# Patient Record
Sex: FEMALE | URBAN - METROPOLITAN AREA
[De-identification: names, ages, dates, MRNs, and addresses within clinical notes are randomized per-mention and may not be internally consistent; named-entity substitution may affect disease eponyms.]

---

## 2020-01-10 ENCOUNTER — EMERGENCY (EMERGENCY)
Facility: HOSPITAL | Age: 55
LOS: 1 days | Discharge: HOME | End: 2020-01-10
Attending: EMERGENCY MEDICINE | Admitting: EMERGENCY MEDICINE
Payer: COMMERCIAL

## 2020-01-10 VITALS
RESPIRATION RATE: 16 BRPM | TEMPERATURE: 98 F | HEIGHT: 66 IN | OXYGEN SATURATION: 98 % | DIASTOLIC BLOOD PRESSURE: 85 MMHG | SYSTOLIC BLOOD PRESSURE: 142 MMHG | HEART RATE: 84 BPM | WEIGHT: 130.07 LBS

## 2020-01-10 LAB
ALBUMIN SERPL ELPH-MCNC: 3.8 G/DL — SIGNIFICANT CHANGE UP (ref 3.3–5)
ALP SERPL-CCNC: 90 U/L — SIGNIFICANT CHANGE UP (ref 30–120)
ALT FLD-CCNC: 53 U/L DA — SIGNIFICANT CHANGE UP (ref 10–60)
AMPHET UR-MCNC: NEGATIVE — SIGNIFICANT CHANGE UP
ANION GAP SERPL CALC-SCNC: 9 MMOL/L — SIGNIFICANT CHANGE UP (ref 5–17)
APAP SERPL-MCNC: <1 UG/ML — LOW (ref 10–30)
AST SERPL-CCNC: 83 U/L — HIGH (ref 10–40)
BARBITURATES UR SCN-MCNC: NEGATIVE — SIGNIFICANT CHANGE UP
BASOPHILS # BLD AUTO: 0.02 K/UL — SIGNIFICANT CHANGE UP (ref 0–0.2)
BASOPHILS NFR BLD AUTO: 0.6 % — SIGNIFICANT CHANGE UP (ref 0–2)
BENZODIAZ UR-MCNC: NEGATIVE — SIGNIFICANT CHANGE UP
BILIRUB SERPL-MCNC: 0.2 MG/DL — SIGNIFICANT CHANGE UP (ref 0.2–1.2)
BUN SERPL-MCNC: 11 MG/DL — SIGNIFICANT CHANGE UP (ref 7–23)
CALCIUM SERPL-MCNC: 8.8 MG/DL — SIGNIFICANT CHANGE UP (ref 8.4–10.5)
CHLORIDE SERPL-SCNC: 106 MMOL/L — SIGNIFICANT CHANGE UP (ref 96–108)
CO2 SERPL-SCNC: 30 MMOL/L — SIGNIFICANT CHANGE UP (ref 22–31)
COCAINE METAB.OTHER UR-MCNC: NEGATIVE — SIGNIFICANT CHANGE UP
CREAT SERPL-MCNC: 0.47 MG/DL — LOW (ref 0.5–1.3)
EOSINOPHIL # BLD AUTO: 0.02 K/UL — SIGNIFICANT CHANGE UP (ref 0–0.5)
EOSINOPHIL NFR BLD AUTO: 0.6 % — SIGNIFICANT CHANGE UP (ref 0–6)
ETHANOL SERPL-MCNC: 361 MG/DL — HIGH (ref 0–3)
GLUCOSE SERPL-MCNC: 98 MG/DL — SIGNIFICANT CHANGE UP (ref 70–99)
HCT VFR BLD CALC: 40 % — SIGNIFICANT CHANGE UP (ref 34.5–45)
HGB BLD-MCNC: 13.9 G/DL — SIGNIFICANT CHANGE UP (ref 11.5–15.5)
IMM GRANULOCYTES NFR BLD AUTO: 0.3 % — SIGNIFICANT CHANGE UP (ref 0–1.5)
LYMPHOCYTES # BLD AUTO: 1.44 K/UL — SIGNIFICANT CHANGE UP (ref 1–3.3)
LYMPHOCYTES # BLD AUTO: 46.3 % — HIGH (ref 13–44)
MCHC RBC-ENTMCNC: 33.7 PG — SIGNIFICANT CHANGE UP (ref 27–34)
MCHC RBC-ENTMCNC: 34.8 GM/DL — SIGNIFICANT CHANGE UP (ref 32–36)
MCV RBC AUTO: 97.1 FL — SIGNIFICANT CHANGE UP (ref 80–100)
METHADONE UR-MCNC: NEGATIVE — SIGNIFICANT CHANGE UP
MONOCYTES # BLD AUTO: 0.4 K/UL — SIGNIFICANT CHANGE UP (ref 0–0.9)
MONOCYTES NFR BLD AUTO: 12.9 % — SIGNIFICANT CHANGE UP (ref 2–14)
NEUTROPHILS # BLD AUTO: 1.22 K/UL — LOW (ref 1.8–7.4)
NEUTROPHILS NFR BLD AUTO: 39.3 % — LOW (ref 43–77)
NRBC # BLD: 0 /100 WBCS — SIGNIFICANT CHANGE UP (ref 0–0)
OPIATES UR-MCNC: NEGATIVE — SIGNIFICANT CHANGE UP
PCP SPEC-MCNC: SIGNIFICANT CHANGE UP
PCP UR-MCNC: NEGATIVE — SIGNIFICANT CHANGE UP
PLATELET # BLD AUTO: 125 K/UL — LOW (ref 150–400)
POTASSIUM SERPL-MCNC: 2.8 MMOL/L — CRITICAL LOW (ref 3.5–5.3)
POTASSIUM SERPL-SCNC: 2.8 MMOL/L — CRITICAL LOW (ref 3.5–5.3)
PROT SERPL-MCNC: 7.8 G/DL — SIGNIFICANT CHANGE UP (ref 6–8.3)
RBC # BLD: 4.12 M/UL — SIGNIFICANT CHANGE UP (ref 3.8–5.2)
RBC # FLD: 13.7 % — SIGNIFICANT CHANGE UP (ref 10.3–14.5)
SALICYLATES SERPL-MCNC: 5.5 MG/DL — SIGNIFICANT CHANGE UP (ref 2.8–20)
SODIUM SERPL-SCNC: 145 MMOL/L — SIGNIFICANT CHANGE UP (ref 135–145)
THC UR QL: NEGATIVE — SIGNIFICANT CHANGE UP
WBC # BLD: 3.11 K/UL — LOW (ref 3.8–10.5)
WBC # FLD AUTO: 3.11 K/UL — LOW (ref 3.8–10.5)

## 2020-01-10 PROCEDURE — 93010 ELECTROCARDIOGRAM REPORT: CPT

## 2020-01-10 PROCEDURE — 99285 EMERGENCY DEPT VISIT HI MDM: CPT

## 2020-01-10 RX ORDER — POTASSIUM CHLORIDE 20 MEQ
40 PACKET (EA) ORAL ONCE
Refills: 0 | Status: COMPLETED | OUTPATIENT
Start: 2020-01-10 | End: 2020-01-10

## 2020-01-10 RX ORDER — SODIUM CHLORIDE 9 MG/ML
1000 INJECTION INTRAMUSCULAR; INTRAVENOUS; SUBCUTANEOUS ONCE
Refills: 0 | Status: COMPLETED | OUTPATIENT
Start: 2020-01-10 | End: 2020-01-10

## 2020-01-10 RX ORDER — ACETAMINOPHEN 500 MG
650 TABLET ORAL ONCE
Refills: 0 | Status: COMPLETED | OUTPATIENT
Start: 2020-01-10 | End: 2020-01-10

## 2020-01-10 RX ORDER — POTASSIUM CHLORIDE 20 MEQ
10 PACKET (EA) ORAL
Refills: 0 | Status: COMPLETED | OUTPATIENT
Start: 2020-01-10 | End: 2020-01-10

## 2020-01-10 RX ADMIN — Medication 650 MILLIGRAM(S): at 20:47

## 2020-01-10 RX ADMIN — Medication 100 MILLIEQUIVALENT(S): at 20:38

## 2020-01-10 RX ADMIN — Medication 100 MILLIEQUIVALENT(S): at 21:43

## 2020-01-10 RX ADMIN — Medication 650 MILLIGRAM(S): at 23:00

## 2020-01-10 RX ADMIN — Medication 650 MILLIGRAM(S): at 23:59

## 2020-01-10 RX ADMIN — Medication 100 MILLIEQUIVALENT(S): at 19:15

## 2020-01-10 RX ADMIN — Medication 75 MILLIGRAM(S): at 20:47

## 2020-01-10 RX ADMIN — Medication 40 MILLIEQUIVALENT(S): at 19:15

## 2020-01-10 NOTE — ED PROVIDER NOTE - NSFOLLOWUPINSTRUCTIONS_ED_ALL_ED_FT
Follow up at [facility] on Monday    Avoid taking alcohol      Librium 25 mg three times daily sent to the pharmacy

## 2020-01-10 NOTE — ED PROVIDER NOTE - CARE PLAN
Principal Discharge DX:	Suicidal ideation  Secondary Diagnosis:	Hypokalemia  Secondary Diagnosis:	Alcohol intoxication

## 2020-01-10 NOTE — ED PROVIDER NOTE - CONSTITUTIONAL, MLM
normal... Well appearing, awake, alert, oriented to person, place, time/situation and in no apparent distress. +EtOH like odor on breath

## 2020-01-10 NOTE — ED ADULT NURSE REASSESSMENT NOTE - DESCRIPTION
Pt A&Ox4. Calm, cooperative. States drinks 1 pint vodka everyday. Pt . Lives w/ 2 of her children and her sick father. Pt is full time teacher. States today at work was drunk and stated she "wouldn't mind if she wasn't alive anymore". At this time pt "regrets saying this". Denies suicidal or homicidal thoughts. 1:1 observation remains in place at this time.

## 2020-01-10 NOTE — ED PROVIDER NOTE - PATIENT PORTAL LINK FT
You can access the FollowMyHealth Patient Portal offered by [facility] by registering at the following website: [email or web address]. By joining [email or web address]’s FollowMyHealth portal, you will also be able to view your health information using other applications (apps) compatible with our system.

## 2020-01-10 NOTE — ED ADULT NURSE REASSESSMENT NOTE - NS ED NURSE REASSESS COMMENT FT1
pt resting In bed, 1:1 at bedside, voices no complaints, awaiting sobriety and telepsych, no s/s of withdrawal, aware of plan of care, safety maintained, and will continue to monitor

## 2020-01-10 NOTE — ED ADULT NURSE NOTE - OBJECTIVE STATEMENT
53 y/o female presents to the ed. 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 she reports drinkign today,l ast drink at 4pm. she states she drinks 2 drinks every day. she reports she said 'itwould be easier if I wasn't here anymore" she voices no plan, no homicidal thought. denies any medical complaints. denies nausea vomiting fever chills chest pain. 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 she reports drinking today, last drink at 4pm. she states she drinks a pint of vodka  every day. she reports she said "it would be easier if I wasn't here anymore" She reports she takes care of her two sons and her sick father which is triggering more stress. she voices no plan, no homicidal thought. denies any medical complaints. denies nausea vomiting fever chills chest pain. denies drug use.

## 2020-01-10 NOTE — ED PROVIDER NOTE - PROGRESS NOTE DETAILS
Pt is enrolled at [facility] in [facility] she will go to the clinic on Monday. She declined admission into Rehab. She was cleared by Dr Cox, tele psych plan Rx Librium 25mg three times daily

## 2020-01-10 NOTE — ED ADULT NURSE NOTE - NSIMPLEMENTINTERV_GEN_ALL_ED
Implemented All Fall with Harm Risk Interventions:  [place] to call system. Call bell, personal items and telephone within reach. Instruct patient to call for assistance. Room bathroom lighting operational. Non-slip footwear when patient is off stretcher. Physically safe environment: no spills, clutter or unnecessary equipment. Stretcher in lowest position, wheels locked, appropriate side rails in place. Provide visual cue, wrist band, yellow gown, etc. Monitor gait and stability. Monitor for mental status changes and reorient to person, place, and time. Review medications for side effects contributing to fall risk. Reinforce activity limits and safety measures with patient and family. Provide visual clues: red socks.

## 2020-01-10 NOTE — ED PROVIDER NOTE - OBJECTIVE STATEMENT
53 y/o female with a PMHx of anxiety presents to the ED bib [facility] police ems c/o depression. 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 Pt also reports she drinks alcohol everyday. No drug use.  PMD: Pilo Aceves) 55 y/o female with a PMHx of anxiety presents to the ED bib [facility] police ems c/o depression. 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 Pt also reports she drinks alcohol everyday, has some in morning before work to help with her tremors, then drinks after school ends. No drug use.  PMD: [place] ([facility])

## 2020-01-11 VITALS
RESPIRATION RATE: 16 BRPM | TEMPERATURE: 98 F | DIASTOLIC BLOOD PRESSURE: 90 MMHG | HEART RATE: 76 BPM | OXYGEN SATURATION: 96 % | SYSTOLIC BLOOD PRESSURE: 157 MMHG

## 2020-01-11 DIAGNOSIS — F32.4 MAJOR DEPRESSIVE DISORDER, SINGLE EPISODE, IN PARTIAL REMISSION: ICD-10-CM

## 2020-01-11 LAB
ETHANOL SERPL-MCNC: 82 MG/DL — HIGH (ref 0–3)
POTASSIUM SERPL-MCNC: 2.9 MMOL/L — CRITICAL LOW (ref 3.5–5.3)
POTASSIUM SERPL-SCNC: 2.9 MMOL/L — CRITICAL LOW (ref 3.5–5.3)

## 2020-01-11 PROCEDURE — 93005 ELECTROCARDIOGRAM TRACING: CPT

## 2020-01-11 PROCEDURE — 90792 PSYCH DIAG EVAL W/MED SRVCS: CPT | Mod: GT

## 2020-01-11 PROCEDURE — 96376 TX/PRO/DX INJ SAME DRUG ADON: CPT

## 2020-01-11 PROCEDURE — 80307 DRUG TEST PRSMV CHEM ANLYZR: CPT

## 2020-01-11 PROCEDURE — 99285 EMERGENCY DEPT VISIT HI MDM: CPT | Mod: 25

## 2020-01-11 PROCEDURE — 84132 ASSAY OF SERUM POTASSIUM: CPT

## 2020-01-11 PROCEDURE — 85027 COMPLETE CBC AUTOMATED: CPT

## 2020-01-11 PROCEDURE — 36415 COLL VENOUS BLD VENIPUNCTURE: CPT

## 2020-01-11 PROCEDURE — 96374 THER/PROPH/DIAG INJ IV PUSH: CPT

## 2020-01-11 PROCEDURE — 80053 COMPREHEN METABOLIC PANEL: CPT

## 2020-01-11 RX ORDER — POTASSIUM CHLORIDE 20 MEQ
40 PACKET (EA) ORAL ONCE
Refills: 0 | Status: COMPLETED | OUTPATIENT
Start: 2020-01-11 | End: 2020-01-11

## 2020-01-11 RX ADMIN — Medication 40 MILLIEQUIVALENT(S): at 03:49

## 2020-01-11 RX ADMIN — Medication 50 MILLIGRAM(S): at 03:17

## 2020-01-11 RX ADMIN — Medication 650 MILLIGRAM(S): at 01:00

## 2020-01-11 NOTE — ED BEHAVIORAL HEALTH ASSESSMENT NOTE - SAFETY PLAN ADDT'L DETAILS
Safety plan discussed with.../Education provided regarding environmental safety / lethal means restriction/Provision of National Suicide Prevention Lifeline 8-649-602-HZKV (5066)

## 2020-01-11 NOTE — ED BEHAVIORAL HEALTH ASSESSMENT NOTE - SUMMARY
Patient is a 54 year old female, employed as a ,  with 3 sons and domiciled with three sons and her father with a past psychiatric history of depression and alcohol use disorder, no inpatient admission, no outpatient treatment, no history of suicidality who was BIB [place] [employer] due to depression and alcohol intoxication. On exam, patient is calm and cooperative. She reports her mood is stable and denies any symptoms of depression, anxiety, ashanti or psychosis. Her initial presentation (depressed mood and suicidal ideation) was in the context of alcohol intoxication. She denies any SI/HI, remains hopeful about the future. She doesn’t have a history of suicidality, risk remains low. Patient is currently in outpatient substance treatment at [facility], where she will continue receiving treatment. She appears to be at her psychiatric baseline. Patient doesn’t meet criteria for inpatient psychiatric admission.     Plan:  -Patient is psychiatrically clear for discharge  -Patient will follow-up with outpatient substance treatment at [facility] network  - She was educated on the harmful effects of substance use on physical and mental health, and he expressed understanding  -Patient was informed to call 911 and/or go to the nearest ER in case of any emergency

## 2020-01-11 NOTE — ED BEHAVIORAL HEALTH ASSESSMENT NOTE - DETAILS
she has 3 sons who depend on her Reports SI while intoxicated. Denies any past SI or attempts. Grandmother, Uncle- depression Brother- EtOH use Spoke with ED team

## 2020-01-11 NOTE — ED BEHAVIORAL HEALTH ASSESSMENT NOTE - SUICIDE PROTECTIVE FACTORS
Supportive social network of family or friends/Fear of death or the actual act of killing self/Positive therapeutic relationships/Ability to cope with stress/Has future plans/Engaged in work or school/Identifies reasons for living/Frustration tolerance/Responsibility to family and others

## 2020-01-11 NOTE — ED BEHAVIORAL HEALTH ASSESSMENT NOTE - RISK ASSESSMENT
Patient has chronically elevated risk of harm to self and others given substance use history, history of depression, social stressors however mitigating factors include stable mood, family involvement, no history of suicidality, no prior inpatient admissions, female gender.      current episode of aggression/agitation, medication nonadherence, marijuana use, and poor insight/judgement. Low Acute Suicide Risk

## 2020-01-11 NOTE — ED ADULT NURSE REASSESSMENT NOTE - DESCRIPTION
Repeat alcohol level 82. Call out to tele-psych for evaluation. Report given by Dr. Fraser. Awaiting call back for tele-conference.

## 2020-01-11 NOTE — ED BEHAVIORAL HEALTH ASSESSMENT NOTE - VIOLENCE PROTECTIVE FACTORS:
Good treatment response/compliance/Relationship stability/Insight into violence risk and need for management/treatment/Employment stability/Residential stability/Engagement in treatment/Affective Stability

## 2020-01-11 NOTE — ED BEHAVIORAL HEALTH ASSESSMENT NOTE - ACTIVATING EVENTS/STRESSORS
Substance intoxication or withdrawal/Triggering events leading to humiliation, shame, and/or despair (e.g. Loss of relationship, financial or health status) (real or anticipated)

## 2020-01-11 NOTE — ED BEHAVIORAL HEALTH ASSESSMENT NOTE - DESCRIPTION
None as per patient Works as a . Lives with 3 sons and father.  2 years ago. Patient in the ED for ~11 hours prior to Telepsych consult which was requested at 4:11. Patient arrived to the ED at 17:19 BIB [facility] c/o depression and suicidal ideation. Patient endorsed SI (no plan/intent noted) while at work, stating everything would be better if she were dead. Patient went on to report that she drinks alcohol every day and has some in the morning to help with her tremors. Patients BAL ~361 at 18:13 and ~82 at 2:46. Patient given Libruim 75mg ~20:39 and 50mg ~ 3:13 with good effect. Per ED nurse note, patient presents with good grooming, speech is noted to be at a normal rate, clear/audible, with good/insight judgement, good eye contact and an appropriate behavior. Patient complied with triage protocols - wanded, in a gown, provided blood/urine and belongings collected.  Patient is unaccompanied by family or social supports. Patient placed on a 1:1 and in private room for telepsych assessment. No additional complaints at this time.

## 2020-01-11 NOTE — ED BEHAVIORAL HEALTH ASSESSMENT NOTE - HPI (INCLUDE ILLNESS QUALITY, SEVERITY, DURATION, TIMING, CONTEXT, MODIFYING FACTORS, ASSOCIATED SIGNS AND SYMPTOMS)
Patient is a 54 year old female, employed as a ,  with 3 sons and domiciled with three sons and her father. She has a past psychiatric history of depression and alcohol use disorder, no inpatient admission, no outpatient treatment, no history of suicidality. Patient was BIB [place] [employer] due to depression and alcohol intoxication.     As per chart, upon arrival patient has BAL of 361 and was endorsing depression and suicidal ideation stating “everything would be easier if I were dead.” Once her BAL was <100 and sober, patient denied any SI however psychiatry was consulted for evaluation.  On exam, patient was calm and cooperative. She reports she has been struggling with alcohol use for many years, however it has worsened over the past year due to multiple stressors (recently , work related stressor and her elderly father moved in with her). She reports drinking 1 pint of vodka daily, drinks first thing in the morning due to “shakes” then she goes to her fulltime job as a , and once she gets home she drinks again. She reports the longest time she’s been sober is 3 years with the aid of rehab and acamprosate. She reports feelings of sadness but denies feeling “clinically depressed.” She report slow mood but denies any feelings of low energy, trouble concentrating, guilt or hopelessness. She reports she remains hopeful about the future. She reports she enjoys her job and her threes sons are her main protective factor. She reports taking Zoloft 100mg po daily, prescribed by PCP. She reports she sleep fine. Reports poor appetite due to alcohol use. She reports feeling anxiety due to alcohol use.     Denies any symptoms of ashanti, psychosis or PTSD.     Attempted to obtain a number for collateral however patient reports refuses to give any numbers. She reports her father is elderly and sleeping at this hour, as are her children, therefore unable to get any collateral. Attempted to contact [facility]

## 2020-01-11 NOTE — ED ADULT NURSE REASSESSMENT NOTE - DESCRIPTION
Tele-psych conference completed. Pt offered  while in ER for referral to detox. Pt refused. 1:1 discontinued. D/C home w/ f/u to PCP for detox referral. Verbalized understanding.

## 2022-06-23 NOTE — ED PROVIDER NOTE - DISPOSITION TYPE
"Got back from cruise on 6/19  The family that pt was with on the cruise had covid. Pt with the family the whole wk. With throbbing h/a on forehead and between eyebrows, constant, watery eyes, runny nose. Onset 3 days ago. Pt states she never gets h/a. States h/a not going away. Takes tylenol, does not help much. No thunderclap. No n/v, fever, cough, stuffy nose, change in vision, sore throat, sob, chest pain, diarrhea. T 98.1  Per pt, she's always dizzy, ""Dr. Antoinette Torrez knows that\"", not worse, just normal.    Tested negative on home covid test, 6/19, 6/22. Pt states she is working out perfectly fine. Does have energy. Drinks a lot of water. Takes vit c, elderberry gummies, vit d. Advised to try to have pcr test, more accurate.     Continue fluids, tylenol, vit d, c, elderberry, to have adequate rest, zinc.            "
From: Eladio Ng  To: Carlos Enrique Covert  Sent: 6/23/2022 9:43 AM CDT  Subject: Headache and some pressure     Good morning,  This past week went on vacation and just got back. The family we were with all got covid. I've had a headache/ pressure and tired. I never get headaches. This has been for the last 3 days, Its not going away. I have tested and I'm negative is there anything I can take. My friend said that's what she had before she tested positive.  Thanks
Per Dr. Nilson Nicole: Take 600 mg of Advil for headache, might work better than Tylenol. Pt made aware of pcp recommendation, verbalized understanding. To call back if does test positive for covid. To go to [facility] if not better/worsening.
DISCHARGE

## 2022-08-01 ENCOUNTER — NON-APPOINTMENT (OUTPATIENT)
Age: 57
End: 2022-08-01

## 2023-06-20 ENCOUNTER — NON-APPOINTMENT (OUTPATIENT)
Age: 58
End: 2023-06-20

## 2023-06-22 PROBLEM — F41.9 ANXIETY DISORDER, UNSPECIFIED: Chronic | Status: ACTIVE | Noted: 2020-01-10

## 2023-06-22 PROBLEM — F10.10 ALCOHOL ABUSE, UNCOMPLICATED: Chronic | Status: ACTIVE | Noted: 2020-01-10

## 2023-06-23 ENCOUNTER — APPOINTMENT (OUTPATIENT)
Dept: ORTHOPEDIC SURGERY | Facility: CLINIC | Age: 58
End: 2023-06-23

## 2023-07-19 PROBLEM — Z00.00 ENCOUNTER FOR PREVENTIVE HEALTH EXAMINATION: Status: ACTIVE | Noted: 2023-07-19

## 2024-02-20 NOTE — ED BEHAVIORAL HEALTH ASSESSMENT NOTE - ABUSE / TRAUMA HISTORY
[Joint Pain] : joint pain [Joint Stiffness] : joint stiffness [Muscle Pain] : muscle pain [Joint Swelling] : joint swelling [Negative] : Constitutional No

## 2025-02-19 ENCOUNTER — NON-APPOINTMENT (OUTPATIENT)
Age: 60
End: 2025-02-19